# Patient Record
Sex: FEMALE | Race: OTHER | HISPANIC OR LATINO | URBAN - METROPOLITAN AREA
[De-identification: names, ages, dates, MRNs, and addresses within clinical notes are randomized per-mention and may not be internally consistent; named-entity substitution may affect disease eponyms.]

---
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
## 2023-12-20 ENCOUNTER — OFFICE VISIT (OUTPATIENT)
Dept: OBSTETRICS AND GYNECOLOGY | Age: 23
End: 2023-12-20
Payer: COMMERCIAL

## 2023-12-20 VITALS
HEIGHT: 65 IN | BODY MASS INDEX: 33.49 KG/M2 | WEIGHT: 201 LBS | SYSTOLIC BLOOD PRESSURE: 122 MMHG | DIASTOLIC BLOOD PRESSURE: 72 MMHG

## 2023-12-20 DIAGNOSIS — Z01.419 ENCOUNTER FOR GYNECOLOGICAL EXAMINATION: Primary | ICD-10-CM

## 2023-12-20 DIAGNOSIS — L68.0 FEMALE HIRSUTISM: ICD-10-CM

## 2023-12-20 DIAGNOSIS — N92.6 IRREGULAR MENSES: ICD-10-CM

## 2023-12-20 DIAGNOSIS — Z31.9 INFERTILITY MANAGEMENT: ICD-10-CM

## 2023-12-20 LAB
B-HCG UR QL: NEGATIVE
EXPIRATION DATE: NORMAL
INTERNAL NEGATIVE CONTROL: NORMAL
INTERNAL POSITIVE CONTROL: NORMAL
Lab: NORMAL

## 2023-12-20 RX ORDER — FLUOXETINE HYDROCHLORIDE 20 MG/1
20 CAPSULE ORAL
COMMUNITY
Start: 2023-09-12

## 2023-12-20 RX ORDER — MEDROXYPROGESTERONE ACETATE 10 MG/1
10 TABLET ORAL DAILY
Qty: 10 TABLET | Refills: 0 | Status: SHIPPED | OUTPATIENT
Start: 2023-12-20

## 2023-12-20 NOTE — PROGRESS NOTES
Subjective       History of Present Illness    Chief Complaint   Patient presents with    Gynecologic Exam     New gyn: estab.care,discuss irregular menses,requests a pap,discuss fertility       Eliza Wong is a 23 y.o. female who presents for annual exam.  New patient to our office  Has very irregular cycles  Oftentimes skips cycles  Started her periods at 15 and they have never been regular.  She will skip up to 6 months.  Some time periods she will have regular, monthly cycles  [place] and was tested for PCOS and states it was normal but unsure what was checked  Has been trying for pregnancy for just under a year  Same partner  He does have a child from a previous relationship  She notes some hair growth on her chin that she plucks  No other problems or concerns    The following portions of the patient's history were reviewed and updated as appropriate: allergies, current medications, past family history, past medical history, past social history, past surgical history and problem list.    Her menses are irregular, lasting 4-7 days.    Current contraception: none  History of abnormal Pap smear: no  Perform regular self breast exam: no  Family history of uterine or ovarian cancer: no  Family History of colon cancer: no  Family history of breast cancer: no    Mammogram: not indicated.  Colonoscopy: not indicated.  DEXA: not indicated.  Last Pap:    Social History    Tobacco Use      Smoking status: Never      Smokeless tobacco: Not on file    Exercise: moderately active  Calcium/Vitamin D: adequate intake    The following portions of the patient's history were reviewed and updated as appropriate: allergies, current medications, past family history, past medical history, past social history, past surgical history, and problem list.    Review of Systems   Constitutional: Negative.    HENT: Negative.     Eyes: Negative.    Respiratory: Negative.     Cardiovascular: Negative.   "  Gastrointestinal: Negative.    Endocrine: Negative.    Genitourinary:  Positive for menstrual problem.   Musculoskeletal: Negative.    Skin: Negative.    Allergic/Immunologic: Negative.    Neurological: Negative.    Hematological: Negative.    Psychiatric/Behavioral: Negative.           Objective   Physical Exam  Vitals reviewed.   Constitutional:       Appearance: She is well-developed.   Neck:      Thyroid: No thyroid mass.   Cardiovascular:      Rate and Rhythm: Normal rate and regular rhythm.      Heart sounds: Normal heart sounds.   Pulmonary:      Effort: Pulmonary effort is normal.      Breath sounds: Normal breath sounds.   Chest:   Breasts:     Right: No mass, nipple discharge, skin change or tenderness.      Left: No mass, nipple discharge, skin change or tenderness.   Abdominal:      Palpations: Abdomen is soft.      Tenderness: There is no abdominal tenderness.   Genitourinary:     Labia:         Right: No rash or lesion.         Left: No rash or lesion.       Vagina: Normal.      Cervix: No cervical motion tenderness, discharge or friability.      Adnexa:         Right: No mass or tenderness.          Left: No mass or tenderness.     Neurological:      Mental Status: She is alert and oriented to person, place, and time.   Psychiatric:         Behavior: Behavior normal.         /72   Ht 163.8 cm (64.5\")   Wt 91.2 kg (201 lb)   LMP 08/01/2023 (Approximate)   BMI 33.97 kg/m²     Assessment & Plan   Diagnoses and all orders for this visit:    1. Encounter for gynecological examination (Primary)  -     POC Pregnancy, Urine    2. Irregular menses  -     17-Hydroxyprogesterone  -     Hemoglobin A1c  -     Lipid Panel  -     TSH  -     Prolactin  -     HCG, B-subunit, Quantitative  -     IGP,CtNg,rfx Apt HPV All Pth; Future  -     POC Pregnancy, Urine    3. Female hirsutism  -     17-Hydroxyprogesterone  -     Hemoglobin A1c  -     Lipid Panel  -     Testosterone, Free, Total  -     TSH  -     " Prolactin  -     HCG, B-subunit, Quantitative  -     IGP,CtNg,rfx Apt HPV All Pth; Future  -     POC Pregnancy, Urine    4. Infertility management  -     POC Pregnancy, Urine    Other orders  -     medroxyPROGESTERone (Provera) 10 MG tablet; Take 1 tablet by mouth Daily.  Dispense: 10 tablet; Refill: 0          Breast self exam technique reviewed and patient encouraged to perform self-exam monthly.  Discussed healthy lifestyle modifications.  Pap smear done with GC/CHL  Recommended 30 minutes of aerobic exercise five times per week.  Discussed calcium needs to prevent osteoporosis  Plan labs for amenorrhea/PCOS  Start OPKs with next menses  Discussed timed IC  Plan pelvic ultrasound for irregular menses- at that visit can discuss what cycles are doing and if ovulation induction meds would be appropriate  Order for semen analysis given

## 2023-12-22 LAB
17OHP SERPL-MCNC: 25 NG/DL
CHOLEST SERPL-MCNC: 169 MG/DL (ref 0–200)
HBA1C MFR BLD: 5.2 % (ref 4.8–5.6)
HCG INTACT+B SERPL-ACNC: <1 MIU/ML
HDLC SERPL-MCNC: 37 MG/DL (ref 40–60)
LDLC SERPL CALC-MCNC: 90 MG/DL (ref 0–100)
PROLACTIN SERPL-MCNC: 10.8 NG/ML (ref 4.8–33.4)
TESTOST FREE SERPL-MCNC: 0.4 PG/ML (ref 0–4.2)
TESTOST SERPL-MCNC: 28 NG/DL (ref 13–71)
TRIGL SERPL-MCNC: 247 MG/DL (ref 0–150)
TSH SERPL DL<=0.005 MIU/L-ACNC: 1.82 UIU/ML (ref 0.27–4.2)
VLDLC SERPL CALC-MCNC: 42 MG/DL (ref 5–40)

## 2023-12-27 LAB
C TRACH RRNA CVX QL NAA+PROBE: NEGATIVE
CONV .: ABNORMAL
CYTOLOGIST CVX/VAG CYTO: ABNORMAL
CYTOLOGY CVX/VAG DOC CYTO: ABNORMAL
CYTOLOGY CVX/VAG DOC THIN PREP: ABNORMAL
DX ICD CODE: ABNORMAL
DX ICD CODE: ABNORMAL
HIV 1 & 2 AB SER-IMP: ABNORMAL
HPV I/H RISK 4 DNA CVX QL PROBE+SIG AMP: POSITIVE
N GONORRHOEA RRNA CVX QL NAA+PROBE: NEGATIVE
OTHER STN SPEC: ABNORMAL
PATHOLOGIST CVX/VAG CYTO: ABNORMAL
RECOM F/U CVX/VAG CYTO: ABNORMAL
STAT OF ADQ CVX/VAG CYTO-IMP: ABNORMAL
T VAGINALIS RRNA SPEC QL NAA+PROBE: NEGATIVE

## 2024-01-03 ENCOUNTER — OFFICE VISIT (OUTPATIENT)
Dept: OBSTETRICS AND GYNECOLOGY | Age: 24
End: 2024-01-03
Payer: COMMERCIAL

## 2024-01-03 VITALS
BODY MASS INDEX: 33.99 KG/M2 | SYSTOLIC BLOOD PRESSURE: 100 MMHG | DIASTOLIC BLOOD PRESSURE: 60 MMHG | WEIGHT: 204 LBS | HEIGHT: 65 IN

## 2024-01-03 DIAGNOSIS — R87.610 ATYPICAL SQUAMOUS CELLS OF UNDETERMINED SIGNIFICANCE ON CYTOLOGIC SMEAR OF CERVIX (ASC-US): Primary | ICD-10-CM

## 2024-01-03 DIAGNOSIS — E28.2 PCOS (POLYCYSTIC OVARIAN SYNDROME): ICD-10-CM

## 2024-01-03 RX ORDER — PREDNISOLONE ACETATE 10 MG/ML
SUSPENSION/ DROPS OPHTHALMIC
COMMUNITY
Start: 2023-12-28

## 2024-01-03 RX ORDER — METFORMIN HYDROCHLORIDE 500 MG/1
500 TABLET, EXTENDED RELEASE ORAL 3 TIMES DAILY
Qty: 90 TABLET | Refills: 3 | Status: SHIPPED | OUTPATIENT
Start: 2024-01-03

## 2024-01-03 NOTE — PROGRESS NOTES
[facility]   Obstetrics and Gynecology     1/3/2024      Patient:  Eliza Wong   MR#:[id number]    Office note    Chief Complaint   Patient presents with    Follow-up     C/o : patient coming in for US & follow up about irregular menstrual cycles.       Subjective     History of Present Illness  23 y.o. female No obstetric history on file. Presenting for discussion of abnormal bleeding and infertility. She has been attempting to become pregnant for almost one hyear now, but has sporadic bleeding. She did have labs for PCOS at last visit, which were normal including A1c, but did have elevated triglycerides.       Relevant data reviewed: TVUS 1/3/24 see report      Patient Active Problem List   Diagnosis    Irregular menses    Female hirsutism    Infertility management    PCOS (polycystic ovarian syndrome)       Past Medical History:   Diagnosis Date    PCOS (polycystic ovarian syndrome) 1/3/2024     Past Surgical History:   Procedure Laterality Date    EYE SURGERY  2016    WISDOM TOOTH EXTRACTION       Obstetric History:  OB History    No obstetric history on file.        Menstrual History:     No LMP recorded (lmp unknown). (Menstrual status: Other).       No obstetric history on file.  Family History   Problem Relation Age of Onset    Diabetes Mother      Social History     Tobacco Use    Smoking status: Never   Vaping Use    Vaping Use: Never used   Substance Use Topics    Alcohol use: Not Currently    Drug use: Never     Patient has no known allergies.    Current Outpatient Medications:     FLUoxetine (PROzac) 20 MG capsule, 1 capsule., Disp: , Rfl:     medroxyPROGESTERone (Provera) 10 MG tablet, Take 1 tablet by mouth Daily. (Patient not taking: Reported on 1/3/2024), Disp: 10 tablet, Rfl: 0    metFORMIN ER (GLUCOPHAGE-XR) 500 MG 24 hr tablet, Take 1 tablet by mouth 3 times a day. Begin taking 1 tablet with lunch or dinner for 2 weeks, Increase to one tab with lunch and dinner and then  "take one tab with each meal after 2 more weeks., Disp: 90 tablet, Rfl: 3    prednisoLONE acetate (PRED FORTE) 1 % ophthalmic suspension, , Disp: , Rfl:       Review of Systems   All other systems reviewed and are negative.      BP Readings from Last 3 Encounters:   01/03/24 100/60   12/20/23 122/72      Wt Readings from Last 3 Encounters:   01/03/24 92.5 kg (204 lb)   12/20/23 91.2 kg (201 lb)      BMI: Estimated body mass index is 34.48 kg/m² as calculated from the following:    Height as of this encounter: 163.8 cm (64.5\").    Weight as of this encounter: 92.5 kg (204 lb). BSA: Estimated body surface area is 1.98 meters squared as calculated from the following:    Height as of this encounter: 163.8 cm (64.5\").    Weight as of this encounter: 92.5 kg (204 lb).    Objective   Physical Exam  Vitals and nursing note reviewed.   Constitutional:       Appearance: Normal appearance.   HENT:      Head: Normocephalic and atraumatic.   Pulmonary:      Effort: Pulmonary effort is normal.   Skin:     General: Skin is warm and dry.   Neurological:      Mental Status: She is alert and oriented to person, place, and time.   Psychiatric:         Mood and Affect: Mood normal.         Assessment & Plan     23 y.o. female G0 Presenting for discussion of abnormal bleeding and infertility.     Diagnoses and all orders for this visit:    1. Atypical squamous cells of undetermined significance on cytologic smear of cervix (ASC-US) (Primary)  Comments:  - recommend pap in one year.    2. PCOS (polycystic ovarian syndrome)  Overview:  - Polycystic appearing ovaries on imaging, hirsuitism, oligomenorrhea  - discussed criteria and dx with patient as well as explanation of anovulatory cycles and importance of having at least 3-4 periods/year for endometrial protection.   - Patient is currently interested in becoming pregnant. Encouraged 5% weight loss and discussed increased rate of ovulation with weight loss.   - Start metformin. "     Orders:  -     metFORMIN ER (GLUCOPHAGE-XR) 500 MG 24 hr tablet; Take 1 tablet by mouth 3 times a day. Begin taking 1 tablet with lunch or dinner for 2 weeks, Increase to one tab with lunch and dinner and then take one tab with each meal after 2 more weeks.  Dispense: 90 tablet; Refill: 3        Return in about 3 months (around 4/3/2024).    Izabel Wilkins MD   1/3/2024 12:39 EST

## 2024-01-08 ENCOUNTER — TELEPHONE (OUTPATIENT)
Dept: LABOR AND DELIVERY | Facility: HOSPITAL | Age: 24
End: 2024-01-08
Payer: COMMERCIAL

## 2024-01-08 NOTE — TELEPHONE ENCOUNTER
Called patient to inform her of Pap smear results.  Discussed with her that ASCUS results indicate that the cells look abnormal but do not look cancerous or precancerous.  Recommend follow-up in 1 year.  Message sent to .

## 2024-07-01 ENCOUNTER — TELEPHONE (OUTPATIENT)
Dept: OBSTETRICS AND GYNECOLOGY | Age: 24
End: 2024-07-01

## 2024-07-01 NOTE — TELEPHONE ENCOUNTER
Caller: Eliza Wong    Relationship to patient: Self    Best call back number: [phone]    Chief complaint: POSITIVE PREGNANCY TEST    Type of visit: NEW OB - UNKNOWN LMP DUE TO PCOS.      Additional notes: PATIENT CALLED IN TO SCHEDULE NEW OB APPT. UNKNOWN LMP. HUB UNABLE TO WARM TRANSFER.

## 2024-07-03 ENCOUNTER — OFFICE VISIT (OUTPATIENT)
Dept: OBSTETRICS AND GYNECOLOGY | Age: 24
End: 2024-07-03
Payer: COMMERCIAL

## 2024-07-03 VITALS
WEIGHT: 200 LBS | BODY MASS INDEX: 33.32 KG/M2 | DIASTOLIC BLOOD PRESSURE: 70 MMHG | HEIGHT: 65 IN | SYSTOLIC BLOOD PRESSURE: 110 MMHG

## 2024-07-03 DIAGNOSIS — N92.6 MISSED MENSES: ICD-10-CM

## 2024-07-03 DIAGNOSIS — O20.9 BLEEDING IN EARLY PREGNANCY: Primary | ICD-10-CM

## 2024-07-03 LAB
B-HCG UR QL: POSITIVE
EXPIRATION DATE: ABNORMAL
INTERNAL NEGATIVE CONTROL: ABNORMAL
INTERNAL POSITIVE CONTROL: ABNORMAL
Lab: ABNORMAL

## 2024-07-03 NOTE — PROGRESS NOTES
"Subjective     Chief Complaint   Patient presents with    Gynecologic Exam     Bleeding, cramping in early pregnancy, lmp 24, US today       Eliza Wong is a 24 y.o.  whose LMP is Patient's last menstrual period was 2024.     Pt presents today for add on visit  Her LMP is  but her periods were irregular   Chief complaint of spotting and cramping   She started with low back pain and cramping on the left side today  She has only noticed some light spotting when wiping using the restroom  She was in ER on  and serum HCG was negative      No Additional Complaints Reported    The following portions of the patient's history were reviewed and updated as appropriate:vital signs, allergies, current medications, past medical history, past social history, past surgical history, and problem list      Review of Systems   Pertinent items are noted in HPI.     Objective      /70   Ht 163.8 cm (64.5\")   Wt 90.7 kg (200 lb)   LMP 2024   Breastfeeding No   BMI 33.80 kg/m²     Physical Exam    General:   Alert, no distress   Heart: Not performed today   Lungs: Not performed today.   Breast: Not performed today   Neck: na   Abdomen: {Not performed today   CVA: Not performed today   Pelvis: Not performed today   Extremities: Not performed today   Neurologic: negative   Psychiatric: Normal affect, judgement, and mood       Lab Review   Labs: Urine pregnancy test     Imaging   Ultrasound - Pelvic Vaginal    Assessment & Plan     ASSESSMENT  1. Bleeding in early pregnancy    2. Missed menses        PLAN  1.   Orders Placed This Encounter   Procedures    HCG, B-subunit, Quantitative ([facility] Only)    HCG, B-subunit, Quantitative ([facility] Only)    POC Pregnancy, Urine       2. Medications prescribed this encounter:      No orders of the defined types were placed in this encounter.      3. US results reviewed with patient. There is a complex area adjacent to left ovary. Will check stat quant " HCG today and plan follow up Friday morning for repeat HCG level. Ectopic warnings were discussed. ?early vs SAB vs ectopic.       Heather Zapata, APRN  7/3/2024

## 2024-07-04 LAB — HCG INTACT+B SERPL-ACNC: 198 MIU/ML

## 2024-07-08 DIAGNOSIS — O20.0 THREATENED ABORTION: Primary | ICD-10-CM

## 2024-07-09 LAB — HCG INTACT+B SERPL-ACNC: 92.9 MIU/ML

## 2025-01-09 ENCOUNTER — OFFICE VISIT (OUTPATIENT)
Dept: OBSTETRICS AND GYNECOLOGY | Age: 25
End: 2025-01-09
Payer: COMMERCIAL

## 2025-01-09 VITALS
HEIGHT: 64 IN | DIASTOLIC BLOOD PRESSURE: 86 MMHG | WEIGHT: 199.8 LBS | BODY MASS INDEX: 34.11 KG/M2 | SYSTOLIC BLOOD PRESSURE: 124 MMHG

## 2025-01-09 DIAGNOSIS — R87.610 ATYPICAL SQUAMOUS CELLS OF UNDETERMINED SIGNIFICANCE ON CYTOLOGIC SMEAR OF CERVIX (ASC-US): ICD-10-CM

## 2025-01-09 DIAGNOSIS — N92.6 IRREGULAR MENSES: ICD-10-CM

## 2025-01-09 DIAGNOSIS — Z01.419 ENCOUNTER FOR ANNUAL ROUTINE GYNECOLOGICAL EXAMINATION: Primary | ICD-10-CM

## 2025-01-09 DIAGNOSIS — Z11.3 SCREENING EXAMINATION FOR STI: ICD-10-CM

## 2025-01-09 NOTE — PROGRESS NOTES
[facility]   Obstetrics and Gynecology     2025    Patient: Eliza Wong          MR#:[id number]    History of Present Illness    Chief Complaint   Patient presents with    Gynecologic Exam     CC: annual. Last pap 23 ascus hpv (+).        24 y.o. female  who presents for annual exam. She is doing well today. She did have a miscarriage in July.  Her periods have continued to be very irregular sometimes skipping 2 to 3 months at a time.  She would like to try something for control of the cycles at this time.  She had an abnormal Pap smear last year that was ASCUS HPV positive.    Relevant data reviewed:    Patient's last menstrual period was 2025 (exact date).  Obstetric History:  OB History          1    Para        Term                AB        Living             SAB        IAB        Ectopic        Molar        Multiple        Live Births                   Menstrual History:     Patient's last menstrual period was 2025 (exact date).       Social History     Substance and Sexual Activity   Sexual Activity Yes    Partners: Male    Birth control/protection: None     ______________________________________  Patient Active Problem List   Diagnosis    Irregular menses    Female hirsutism    Infertility management    PCOS (polycystic ovarian syndrome)     Past Medical History:   Diagnosis Date    Abnormal Pap smear of cervix     Anxiety     Depression     Migraine     PCOS (polycystic ovarian syndrome) 2024     Past Surgical History:   Procedure Laterality Date    EYE SURGERY  2016    WISDOM TOOTH EXTRACTION       Social History     Tobacco Use   Smoking Status Never   Smokeless Tobacco Not on file     Family History   Problem Relation Age of Onset    Diabetes Mother      Prior to Admission medications    Medication Sig Start Date End Date Taking? Authorizing Provider   metFORMIN ER (GLUCOPHAGE-XR) 500 MG 24 hr tablet Take 1 tablet by mouth 3 times a  "day. Begin taking 1 tablet with lunch or dinner for 2 weeks, Increase to one tab with lunch and dinner and then take one tab with each meal after 2 more weeks.  Patient not taking: Reported on 1/9/2025 1/3/24   Izabel Wilkins MD     _______________________________________    Current contraception: none  History of abnormal Pap smear: yes - ASCUS, HPV positive in 2023  Family history of uterine or ovarian cancer: no  Family History of colon cancer/colon polyps: no      The following portions of the patient's history were reviewed and updated as appropriate: allergies, current medications, past family history, past medical history, past social history, past surgical history, and problem list.        Pertinent items are noted in HPI.       Objective   Physical Exam  Vitals and nursing note reviewed. Exam conducted with a chaperone present.   Constitutional:       Appearance: Normal appearance.   HENT:      Head: Normocephalic and atraumatic.   Pulmonary:      Effort: Pulmonary effort is normal.   Abdominal:      General: Abdomen is flat.      Palpations: Abdomen is soft.   Genitourinary:     Exam position: Lithotomy position.      Labia:         Right: No rash or lesion.         Left: No rash or lesion.       Vagina: Normal. No vaginal discharge or lesions.      Cervix: Normal. No lesion.      Uterus: Normal. Not tender.       Adnexa: Right adnexa normal and left adnexa normal.        Right: No mass or tenderness.          Left: No mass or tenderness.     Skin:     General: Skin is warm and dry.   Neurological:      Mental Status: She is alert and oriented to person, place, and time.   Psychiatric:         Mood and Affect: Mood normal.         /86   Ht 163 cm (64.17\")   Wt 90.6 kg (199 lb 12.8 oz)   LMP 01/04/2025 (Exact Date)   BMI 34.11 kg/m²    BP Readings from Last 3 Encounters:   01/09/25 124/86   10/24/24 132/86   07/03/24 110/70      Wt Readings from Last 3 Encounters:   01/09/25 90.6 kg (199 " "lb 12.8 oz)   10/24/24 90.7 kg (200 lb)   24 90.7 kg (200 lb)        BMI: Estimated body mass index is 34.11 kg/m² as calculated from the following:    Height as of this encounter: 163 cm (64.17\").    Weight as of this encounter: 90.6 kg (199 lb 12.8 oz).    As part of wellness and prevention, the following topics were discussed with the patient:  Encouraged self breast exam  Sexual transmitted disease prevention   Contraception discussed.   Dental health discussed  Mental health discussed.   Vaccinations/immunizations addressed.           Problem List   Meds  History  Prep for Surg   Imagin}    Assessment:  24 y.o. female  who presents for annual exam.  Diagnoses and all orders for this visit:    1. Encounter for annual routine gynecological examination (Primary)    2. Atypical squamous cells of undetermined significance on cytologic smear of cervix (ASC-US)  -     Cancel: IGP, Apt HPV,rfx 16 / 18,45  -     IGP,CtNgTv,Apt HPV,rfx 16 / 18,45    3. Irregular menses  Comments:  - Discussed various methods of management. Patient would like to try continuous OCPs at this time.  Orders:  -     norethindrone-ethinyl estradiol-ferrous fumarate (LOESTIN 24 FE) 1-20 MG-MCG(24) per tablet; Take 1 tablet by mouth Daily.  Dispense: 84 tablet; Refill: 4    4. Screening examination for STI  -     IGP,CtNgTv,Apt HPV,rfx 16 / 18,45    - Pap collected today  - Mammo/Colonoscopy not yet indicated  - Vaccine recs reviewed  - STI screening performed today      Plan:  No follow-ups on file.    Izabel Wilkins MD  2025 11:28 EST  "

## 2025-01-13 LAB
C TRACH RRNA CVX QL NAA+PROBE: NEGATIVE
CYTOLOGIST CVX/VAG CYTO: NORMAL
CYTOLOGY CVX/VAG DOC CYTO: NORMAL
CYTOLOGY CVX/VAG DOC THIN PREP: NORMAL
DX ICD CODE: NORMAL
HPV GENOTYPE REFLEX: NORMAL
HPV I/H RISK 4 DNA CVX QL PROBE+SIG AMP: NEGATIVE
Lab: NORMAL
N GONORRHOEA RRNA CVX QL NAA+PROBE: NEGATIVE
OTHER STN SPEC: NORMAL
STAT OF ADQ CVX/VAG CYTO-IMP: NORMAL
T VAGINALIS RRNA SPEC QL NAA+PROBE: NEGATIVE

## 2025-01-27 ENCOUNTER — TELEPHONE (OUTPATIENT)
Dept: OBSTETRICS AND GYNECOLOGY | Age: 25
End: 2025-01-27
Payer: COMMERCIAL

## 2025-01-27 NOTE — TELEPHONE ENCOUNTER
Pt is confuse on how to take the BCP since her BC pack looks different than what you explained to her. She would like to speak with you to make sure she is taking them correctly. Please advise.

## 2025-08-04 ENCOUNTER — OFFICE VISIT (OUTPATIENT)
Dept: OBSTETRICS AND GYNECOLOGY | Age: 25
End: 2025-08-04
Payer: COMMERCIAL

## 2025-08-04 VITALS
SYSTOLIC BLOOD PRESSURE: 118 MMHG | BODY MASS INDEX: 33.12 KG/M2 | DIASTOLIC BLOOD PRESSURE: 68 MMHG | HEIGHT: 64 IN | WEIGHT: 194 LBS

## 2025-08-04 DIAGNOSIS — N92.6 IRREGULAR MENSES: ICD-10-CM

## 2025-08-04 DIAGNOSIS — A63.0 GENITAL WARTS: ICD-10-CM

## 2025-08-04 DIAGNOSIS — D17.9 LIPOMA, UNSPECIFIED SITE: ICD-10-CM

## 2025-08-04 DIAGNOSIS — Z11.3 SCREEN FOR STD (SEXUALLY TRANSMITTED DISEASE): ICD-10-CM

## 2025-08-04 DIAGNOSIS — E28.2 PCOS (POLYCYSTIC OVARIAN SYNDROME): Primary | ICD-10-CM

## 2025-08-04 DIAGNOSIS — R10.2 PELVIC PAIN: ICD-10-CM

## 2025-08-04 PROCEDURE — 1160F RVW MEDS BY RX/DR IN RCRD: CPT | Performed by: PHYSICIAN ASSISTANT

## 2025-08-04 PROCEDURE — 1159F MED LIST DOCD IN RCRD: CPT | Performed by: PHYSICIAN ASSISTANT

## 2025-08-04 PROCEDURE — 99214 OFFICE O/P EST MOD 30 MIN: CPT | Performed by: PHYSICIAN ASSISTANT

## 2025-08-04 RX ORDER — IMIQUIMOD 12.5 MG/.25G
1 CREAM TOPICAL 3 TIMES WEEKLY
Qty: 24 EACH | Refills: 1 | Status: SHIPPED | OUTPATIENT
Start: 2025-08-04

## 2025-08-06 LAB
BACTERIA UR CULT: NORMAL
BACTERIA UR CULT: NORMAL
C TRACH RRNA SPEC QL NAA+PROBE: NEGATIVE
N GONORRHOEA RRNA SPEC QL NAA+PROBE: NEGATIVE
T VAGINALIS RRNA SPEC QL NAA+PROBE: NEGATIVE